# Patient Record
Sex: FEMALE | Race: WHITE | NOT HISPANIC OR LATINO | URBAN - METROPOLITAN AREA
[De-identification: names, ages, dates, MRNs, and addresses within clinical notes are randomized per-mention and may not be internally consistent; named-entity substitution may affect disease eponyms.]

---

## 2024-07-10 ENCOUNTER — APPOINTMENT (OUTPATIENT)
Dept: URBAN - METROPOLITAN AREA CLINIC 257 | Age: 54
Setting detail: DERMATOLOGY
End: 2024-07-10

## 2024-07-10 DIAGNOSIS — Z41.9 ENCOUNTER FOR PROCEDURE FOR PURPOSES OTHER THAN REMEDYING HEALTH STATE, UNSPECIFIED: ICD-10-CM

## 2024-07-10 PROCEDURE — OTHER ADDITIONAL NOTES: OTHER

## 2024-07-10 PROCEDURE — OTHER DYSPORT: OTHER

## 2024-07-10 PROCEDURE — OTHER COSMETIC CONSULTATION: BOTOX: OTHER

## 2024-07-10 PROCEDURE — OTHER INVENTORY: OTHER

## 2024-07-10 NOTE — PROCEDURE: DYSPORT
Show Additional Area 2: Yes
Right Periorbital Skin Units: 0
Show Right And Left Brow Units: No
Consent: Written consent obtained. Risks include but not limited to lid/brow ptosis, bruising, swelling, diplopia, temporary effect, incomplete chemical denervation.
Detail Level: Detailed
Post-Care Instructions: Patient instructed to not lie down for 4 hours and limit physical activity for 24 hours.
Glabellar Complex Units: 50
Show Inventory Tab: Hide
Dilution (U/0.1 Cc): 25

## 2024-07-10 NOTE — PROCEDURE: ADDITIONAL NOTES
Detail Level: Simple
Additional Notes: Additionally discussed using a retinol for her crows feet to help soften lines and improve texture.
Render Risk Assessment In Note?: no

## 2024-07-31 ENCOUNTER — APPOINTMENT (OUTPATIENT)
Dept: URBAN - METROPOLITAN AREA CLINIC 257 | Age: 54
Setting detail: DERMATOLOGY
End: 2024-07-31

## 2024-07-31 DIAGNOSIS — Z41.9 ENCOUNTER FOR PROCEDURE FOR PURPOSES OTHER THAN REMEDYING HEALTH STATE, UNSPECIFIED: ICD-10-CM

## 2024-07-31 PROCEDURE — OTHER ADDITIONAL NOTES: OTHER

## 2024-07-31 NOTE — PROCEDURE: ADDITIONAL NOTES
Detail Level: Simple
Additional Notes: Discussed dysport results and no need for additional touch up units to the glabella. Discussed possible future treatments of 10 (25 dysport) units in frontalis and 12 (30 Dysport) periorbital units.
Render Risk Assessment In Note?: no

## 2024-10-23 ENCOUNTER — APPOINTMENT (OUTPATIENT)
Dept: URBAN - METROPOLITAN AREA CLINIC 257 | Age: 54
Setting detail: DERMATOLOGY
End: 2024-10-23

## 2024-10-23 DIAGNOSIS — Z41.9 ENCOUNTER FOR PROCEDURE FOR PURPOSES OTHER THAN REMEDYING HEALTH STATE, UNSPECIFIED: ICD-10-CM

## 2024-10-23 PROCEDURE — OTHER INVENTORY: OTHER

## 2024-10-23 PROCEDURE — OTHER DYSPORT: OTHER

## 2024-10-23 NOTE — HPI: COSMETIC (DYSPORT)
previous_has_had_dysport
When Was Your Last Dysport Treatment?: 07/10/24
Additional History: Patient would like to discuss about getting more treatment. Patient presents for further evaluation.

## 2024-10-23 NOTE — PROCEDURE: DYSPORT
Show Nasal Units: Yes
Additional Area 4 Units: 0
Consent: Written consent obtained. Risks include but not limited to lid/brow ptosis, bruising, swelling, diplopia, temporary effect, incomplete chemical denervation.
Show Ucl Units: No
Periorbital Skin Units: 30
Post-Care Instructions: Patient instructed to not lie down for 4 hours and limit physical activity for 24 hours.
Show Inventory Tab: Hide
Dilution (U/0.1 Cc): 25
Glabellar Complex Units: 50
Detail Level: Detailed

## 2025-02-10 ENCOUNTER — APPOINTMENT (OUTPATIENT)
Dept: URBAN - METROPOLITAN AREA CLINIC 256 | Age: 55
Setting detail: DERMATOLOGY
End: 2025-02-10

## 2025-02-10 DIAGNOSIS — L98.8 OTHER SPECIFIED DISORDERS OF THE SKIN AND SUBCUTANEOUS TISSUE: ICD-10-CM

## 2025-02-10 PROCEDURE — OTHER PATIENT SPECIFIC COUNSELING: OTHER

## 2025-02-10 PROCEDURE — OTHER COSMETIC CONSULTATION: DYSPORT: OTHER

## 2025-02-10 PROCEDURE — OTHER DYSPORT: OTHER

## 2025-02-10 PROCEDURE — OTHER COUNSELING: OTHER

## 2025-02-10 PROCEDURE — OTHER MIPS QUALITY: OTHER

## 2025-02-10 ASSESSMENT — LOCATION DETAILED DESCRIPTION DERM
LOCATION DETAILED: LEFT MEDIAL EYEBROW
LOCATION DETAILED: LEFT FOREHEAD
LOCATION DETAILED: LEFT UPPER CUTANEOUS LIP
LOCATION DETAILED: RIGHT SUPERIOR MEDIAL FOREHEAD
LOCATION DETAILED: RIGHT FOREHEAD
LOCATION DETAILED: RIGHT INFERIOR FOREHEAD
LOCATION DETAILED: RIGHT UPPER CUTANEOUS LIP
LOCATION DETAILED: RIGHT INFERIOR MEDIAL FOREHEAD
LOCATION DETAILED: GLABELLA

## 2025-02-10 ASSESSMENT — LOCATION SIMPLE DESCRIPTION DERM
LOCATION SIMPLE: GLABELLA
LOCATION SIMPLE: LEFT LIP
LOCATION SIMPLE: LEFT EYEBROW
LOCATION SIMPLE: RIGHT FOREHEAD
LOCATION SIMPLE: LEFT FOREHEAD
LOCATION SIMPLE: RIGHT LIP

## 2025-02-10 ASSESSMENT — LOCATION ZONE DERM
LOCATION ZONE: LIP
LOCATION ZONE: FACE

## 2025-02-10 NOTE — HPI: COSMETIC (DYSPORT)
previous_has_had_dysport
When Was Your Last Dysport Treatment?: 10/23/2024
Additional History: She presents today for evaluation and management.

## 2025-02-10 NOTE — PROCEDURE: DYSPORT
Show Additional Area 3: Yes
Right Periorbital Skin Units: 0
Show Ucl Units: No
Forehead Units: 24
Glabellar Complex Units: 60
Post-Care Instructions: Patient instructed to not lie down for 4 hours and limit physical activity for 24 hours.
Dilution (U/0.1 Cc): 2.5
Detail Level: Detailed
Consent: Written consent obtained. Risks include but not limited to lid/brow ptosis, bruising, swelling, diplopia, temporary effect, incomplete chemical denervation.
Show Inventory Tab: Show

## 2025-02-10 NOTE — PROCEDURE: PATIENT SPECIFIC COUNSELING
Detail Level: Zone
-her largest concern is her 11 lines. She feels like she doesn't mind her crows feet as much\\n-4 complimentary units (through samples) were used for a lip flip (more to treat vertical upper lip lines) for patient as it's her [age] coming up\\n-Recommended 2 week complimentary touch up as needed\\n-Discussed thermage vs fraxel for lower face